# Patient Record
Sex: FEMALE | NOT HISPANIC OR LATINO
[De-identification: names, ages, dates, MRNs, and addresses within clinical notes are randomized per-mention and may not be internally consistent; named-entity substitution may affect disease eponyms.]

---

## 2018-08-23 ENCOUNTER — TRANSCRIPTION ENCOUNTER (OUTPATIENT)
Age: 48
End: 2018-08-23

## 2018-09-04 ENCOUNTER — TRANSCRIPTION ENCOUNTER (OUTPATIENT)
Age: 48
End: 2018-09-04

## 2021-09-17 ENCOUNTER — APPOINTMENT (OUTPATIENT)
Dept: NEUROLOGY | Facility: CLINIC | Age: 51
End: 2021-09-17
Payer: COMMERCIAL

## 2021-09-17 VITALS
HEIGHT: 63 IN | HEART RATE: 98 BPM | DIASTOLIC BLOOD PRESSURE: 62 MMHG | WEIGHT: 112 LBS | SYSTOLIC BLOOD PRESSURE: 93 MMHG | OXYGEN SATURATION: 98 % | BODY MASS INDEX: 19.84 KG/M2 | TEMPERATURE: 97.3 F

## 2021-09-17 PROBLEM — Z00.00 ENCOUNTER FOR PREVENTIVE HEALTH EXAMINATION: Status: ACTIVE | Noted: 2021-09-17

## 2021-09-17 PROCEDURE — 99204 OFFICE O/P NEW MOD 45 MIN: CPT

## 2021-09-17 NOTE — ASSESSMENT
[FreeTextEntry1] : Patient is a very pleasant 51-year-old female with what sounds like longstanding classic migraines with aura, likely triggered by hormonal changes and she goes through menopause.  Her neurologic exam is essentially normal apart from mild anisocoria.  Given this and her history of right eye blurriness, and MRI brain was recommended.  However the patient wishes to hold off for now due to insurance reasons.  She will continue with Imitrex as abortive therapy for her severe headaches, taking 2 tablets 2 hours apart if needed.  I have also sent a sample of Ubrelvy for her to try.  At this time, her severe headaches are relatively infrequent so preventative therapy is not necessary.  We will have a video visit in 3 months.  She will call sooner if needed.

## 2021-09-17 NOTE — HISTORY OF PRESENT ILLNESS
[FreeTextEntry1] : The patient is a very pleasant 51-year-old female with a history of migraine headaches presenting to establish care for the same.\par \par The patient states she first began experiencing bad headaches in her 30s.  There is stopped several years later only to recur more severely over the last 1 to 2 years.  She describes the sensation as a throbbing sensation that can be behind her eyes, on one side of the head or the other, or in her neck/back of the head.  Worst headaches are associated with nausea/vomiting, photophobia, phonophobia, and osmophobia.  She can have visual aura with or without the headache.  They are very disabling triggers often include significant stress, wine, or milk chocolate which she has learned to avoid.  She typically has approximately 4 of the severe headaches per year but has less severe headaches seasonally which she has attributed to sinuses/allergies.  She takes over-the-counter pain medications once every 2 to 3 weeks.  She uses Imitrex with more severe headaches but did not realize she could take 2 tablets 2 hours apart so had only partial relief.  She was recently seen in urgent care for a severe persistent migraine that lasted approximately 17 hours in the setting of intentional caffeine withdrawal.  The patient notes she is currently going through menopause.  She has a family history of migraines in her mother who also had worsening of her migraines during menopause as well.  She has never had brain imaging.\par \par The patient denies any fever/chills/night sweats, weakness/numbness, or other neurologic symptoms associated with the headaches.  She has decreased vision of the right eye for the last year or so which she states she has been evaluated for in the past.

## 2021-09-17 NOTE — PHYSICAL EXAM
[FreeTextEntry1] : Alert.  Fully oriented.  Speech and language are intact.  Cranial nerves II-XII are intact.  She has mild anisocoria with the left> right, both reactive.  Motor exam reveals intact strength with individual muscle testing in bilateral upper and lower extremities.  Tone is normal.  Reflexes are normal.  Toes are downgoing.  Sensation is intact to light touch in distal extremities.  Finger-to-nose and heel-to-shin are intact.  Rapid alternating movements are normal in the upper and lower extremities.  Gait is normal.\par

## 2022-01-14 ENCOUNTER — NON-APPOINTMENT (OUTPATIENT)
Age: 52
End: 2022-01-14

## 2022-01-19 ENCOUNTER — APPOINTMENT (OUTPATIENT)
Dept: NEUROLOGY | Facility: CLINIC | Age: 52
End: 2022-01-19
Payer: COMMERCIAL

## 2022-01-19 PROCEDURE — 99214 OFFICE O/P EST MOD 30 MIN: CPT | Mod: 95

## 2022-01-19 NOTE — ASSESSMENT
[FreeTextEntry1] : Patient is a very pleasant 51-year-old female with what sounds like longstanding classic migraines with aura. She will continue ot monitor for increased frequency/severity and let me know if things worsen. For onw, she will continue with Imitrex or Ubrelvy which she prefers for abortive therapy. A prescription for Ubrelvy was sent. At this time, her severe headaches are relatively infrequent so preventative therapy is not necessary. \par \par We will have a follow-up visit in 1-2 months to better examine her cognitive concerns. For now, she wishes to hold off on investigations (MRI, labs, etc) until we do our in-person assessment, but she is ok with sending a referral for neuro-psych testing should it be needed in the future as appointments book out far in advance. Overnight oximetry may also be considered if morning headaches/cognitive concerns persist.

## 2022-01-19 NOTE — REASON FOR VISIT
[Home] : at home, [unfilled] , at the time of the visit. [Medical Office: ([facility])___] : at the medical office located in  [Verbal consent obtained from patient] : the patient, [unfilled] [Follow-Up: _____] : a [unfilled] follow-up visit [FreeTextEntry1] : migraines

## 2022-01-19 NOTE — HISTORY OF PRESENT ILLNESS
[FreeTextEntry1] : Patient is a very pleasant 51-year-old female with a history oflongstanding classic migraines with aura, likely triggered by hormonal changes as she goes through menopause. She returns for follow-up.\par \par Since our last visit in Sept, the patient has 3-4 migraines. They headaches are otherwise very similar to her prior headaches.  Typical triggers include dry air, alcohol, and chocolate. She continues to wonder if her hormonal changes during menopause may be triggering her headaches.  Symptoms still improve with Imitrex or Ubrelvy which she actually prefers.  \par \par Otherwise, the patient reports decreased focus and medium/long-term memory loss. She has difficulty finding words and feels her articulation is not what it was in the past. She is stable able to function in her daily life but is concerned about these symptoms particularly because her parents have dementia.

## 2022-01-19 NOTE — PHYSICAL EXAM
[FreeTextEntry1] : Exam is limited due to nature of telehealth visit.  The patient is alert, oriented to conversation.  Speech and language are intact.  Cranial nerves are grossly normal.  There is no apparent focal motor deficit.

## 2022-03-14 ENCOUNTER — APPOINTMENT (OUTPATIENT)
Dept: NEUROLOGY | Facility: CLINIC | Age: 52
End: 2022-03-14
Payer: COMMERCIAL

## 2022-03-14 ENCOUNTER — NON-APPOINTMENT (OUTPATIENT)
Age: 52
End: 2022-03-14

## 2022-03-14 VITALS
DIASTOLIC BLOOD PRESSURE: 63 MMHG | TEMPERATURE: 97.7 F | HEIGHT: 63 IN | OXYGEN SATURATION: 100 % | HEART RATE: 78 BPM | WEIGHT: 113 LBS | BODY MASS INDEX: 20.02 KG/M2 | SYSTOLIC BLOOD PRESSURE: 100 MMHG

## 2022-03-14 PROCEDURE — 99214 OFFICE O/P EST MOD 30 MIN: CPT

## 2022-03-14 NOTE — PHYSICAL EXAM
[FreeTextEntry1] : MMSE 30/30. Speech/language intact. CN grossly intact. Moving all limbs symmetrically. Gait is normal.\par

## 2022-03-14 NOTE — ASSESSMENT
[FreeTextEntry1] : The patient is a very pleasant 51-year-old female with what sounds like longstanding classic migraines with aura. She will use abortives PRN.\par \par The patient will consider neuropsych testing for a baseline due to her concern about her memory. She does not want to pursue MRI at this time. She will discuss labs with her PCP in a few months.\par \par RTC 6 months.

## 2022-03-14 NOTE — HISTORY OF PRESENT ILLNESS
[FreeTextEntry1] : The patient is a very pleasant 51-year-old female with a history of longstanding classic migraines with aura. She returns for follow-up.\par \par Since our last visit in January, she has had no migraine headaches. She has successfully avoid triggers and is very happy that she has had none.  \par \par Otherwise, the patient continues to report short-term memory loss. She is still very highly functioning in her career and able to complete all tasks without coworkers noticing her concern.However, she has forgotten to do a few high-priority tasks in the recent past. She has difficulty finding words and feels her articulation is not what it was in the past. \par \par The patient reports significant stressors related to her aging parents. Her father has fairly advanced dementia and her mother is the caregiver for him but frequently needs her support. She is taking multiple trips up to help them per month and staying there for several days.

## 2022-07-20 ENCOUNTER — APPOINTMENT (OUTPATIENT)
Dept: NEUROLOGY | Facility: CLINIC | Age: 52
End: 2022-07-20

## 2022-07-20 PROCEDURE — 96116 NUBHVL XM PHYS/QHP 1ST HR: CPT

## 2022-07-20 PROCEDURE — 96138 PSYCL/NRPSYC TECH 1ST: CPT

## 2022-07-20 PROCEDURE — 96132 NRPSYC TST EVAL PHYS/QHP 1ST: CPT

## 2022-07-20 PROCEDURE — 96133 NRPSYC TST EVAL PHYS/QHP EA: CPT

## 2022-07-20 PROCEDURE — 96139 PSYCL/NRPSYC TST TECH EA: CPT

## 2022-08-03 ENCOUNTER — APPOINTMENT (OUTPATIENT)
Dept: NEUROLOGY | Facility: CLINIC | Age: 52
End: 2022-08-03

## 2022-08-03 PROCEDURE — 96133 NRPSYC TST EVAL PHYS/QHP EA: CPT | Mod: 95

## 2022-10-03 ENCOUNTER — APPOINTMENT (OUTPATIENT)
Dept: NEUROLOGY | Facility: CLINIC | Age: 52
End: 2022-10-03

## 2022-10-03 VITALS
HEIGHT: 63 IN | HEART RATE: 71 BPM | OXYGEN SATURATION: 100 % | DIASTOLIC BLOOD PRESSURE: 66 MMHG | WEIGHT: 110 LBS | BODY MASS INDEX: 19.49 KG/M2 | TEMPERATURE: 97.2 F | SYSTOLIC BLOOD PRESSURE: 102 MMHG

## 2022-10-03 PROCEDURE — 99213 OFFICE O/P EST LOW 20 MIN: CPT

## 2022-10-03 RX ORDER — SUMATRIPTAN 100 MG/1
100 TABLET, FILM COATED ORAL
Qty: 20 | Refills: 5 | Status: ACTIVE | COMMUNITY
Start: 1900-01-01 | End: 1900-01-01

## 2022-10-03 NOTE — HISTORY OF PRESENT ILLNESS
[FreeTextEntry1] : The patient is a very pleasant 52 year-old female with a history of longstanding classic migraines with aura who also had memory concerns . 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 She is trying to avoid triggers. She has used sumatriptan and Ubrelvy with relief.\par \par Otherwise, the patient is reassured by recent neuro-psych results. She still has trouble at times with short term memory loss and concentration but is aware how much stress/sleep can impact these.\par

## 2022-10-03 NOTE — ASSESSMENT
[FreeTextEntry1] : The patient is a very pleasant 52 year-old female with what sounds like longstanding classic migraines with aura. She will continue abortives PRN. If frequency increases, she will call and we consider preventative therapy. She will try to avoid known triggers.\par \par We had a nice discussion regarding the impact of stress/sleep on our cognitive health. SHe is very aware that caring for parents has been challenging but is trying to make small changes to help ease some of the responsibility. \par \par RTC 1 year, sooner if needed.\par

## 2023-06-12 ENCOUNTER — TRANSCRIPTION ENCOUNTER (OUTPATIENT)
Age: 53
End: 2023-06-12

## 2023-10-03 ENCOUNTER — APPOINTMENT (OUTPATIENT)
Dept: NEUROLOGY | Facility: CLINIC | Age: 53
End: 2023-10-03
Payer: COMMERCIAL

## 2023-10-03 DIAGNOSIS — G43.909 MIGRAINE, UNSPECIFIED, NOT INTRACTABLE, W/OUT STATUS MIGRAINOSUS: ICD-10-CM

## 2023-10-03 DIAGNOSIS — R41.89 OTHER SYMPTOMS AND SIGNS INVOLVING COGNITIVE FUNCTIONS AND AWARENESS: ICD-10-CM

## 2023-10-03 PROCEDURE — 99214 OFFICE O/P EST MOD 30 MIN: CPT | Mod: 95

## 2023-10-04 RX ORDER — UBROGEPANT 50 MG/1
50 TABLET ORAL
Qty: 10 | Refills: 6 | Status: ACTIVE | COMMUNITY
Start: 2022-01-19 | End: 1900-01-01

## 2024-11-19 ENCOUNTER — APPOINTMENT (OUTPATIENT)
Dept: NEUROLOGY | Facility: CLINIC | Age: 54
End: 2024-11-19
Payer: COMMERCIAL

## 2024-11-19 DIAGNOSIS — R41.89 OTHER SYMPTOMS AND SIGNS INVOLVING COGNITIVE FUNCTIONS AND AWARENESS: ICD-10-CM

## 2024-11-19 PROCEDURE — 99214 OFFICE O/P EST MOD 30 MIN: CPT

## 2024-12-03 ENCOUNTER — APPOINTMENT (OUTPATIENT)
Dept: MRI IMAGING | Facility: CLINIC | Age: 54
End: 2024-12-03
Payer: COMMERCIAL

## 2024-12-03 ENCOUNTER — OUTPATIENT (OUTPATIENT)
Dept: OUTPATIENT SERVICES | Facility: HOSPITAL | Age: 54
LOS: 1 days | End: 2024-12-03

## 2024-12-03 PROCEDURE — 70551 MRI BRAIN STEM W/O DYE: CPT | Mod: 26

## 2024-12-04 ENCOUNTER — NON-APPOINTMENT (OUTPATIENT)
Age: 54
End: 2024-12-04

## 2025-05-20 ENCOUNTER — APPOINTMENT (OUTPATIENT)
Dept: NEUROLOGY | Facility: CLINIC | Age: 55
End: 2025-05-20
Payer: COMMERCIAL

## 2025-05-20 DIAGNOSIS — R41.89 OTHER SYMPTOMS AND SIGNS INVOLVING COGNITIVE FUNCTIONS AND AWARENESS: ICD-10-CM

## 2025-05-20 PROCEDURE — 99214 OFFICE O/P EST MOD 30 MIN: CPT | Mod: 95
